# Patient Record
Sex: FEMALE | Race: WHITE | NOT HISPANIC OR LATINO | URBAN - METROPOLITAN AREA
[De-identification: names, ages, dates, MRNs, and addresses within clinical notes are randomized per-mention and may not be internally consistent; named-entity substitution may affect disease eponyms.]

---

## 2022-07-24 ENCOUNTER — INPATIENT (INPATIENT)
Facility: HOSPITAL | Age: 37
LOS: 0 days | Discharge: HOME | End: 2022-07-25
Attending: INTERNAL MEDICINE | Admitting: INTERNAL MEDICINE

## 2022-07-24 VITALS
HEART RATE: 118 BPM | WEIGHT: 125 LBS | RESPIRATION RATE: 20 BRPM | SYSTOLIC BLOOD PRESSURE: 131 MMHG | TEMPERATURE: 99 F | OXYGEN SATURATION: 98 % | DIASTOLIC BLOOD PRESSURE: 90 MMHG

## 2022-07-24 DIAGNOSIS — Z83.6 FAMILY HISTORY OF OTHER DISEASES OF THE RESPIRATORY SYSTEM: ICD-10-CM

## 2022-07-24 DIAGNOSIS — I26.99 OTHER PULMONARY EMBOLISM WITHOUT ACUTE COR PULMONALE: ICD-10-CM

## 2022-07-24 DIAGNOSIS — N39.0 URINARY TRACT INFECTION, SITE NOT SPECIFIED: ICD-10-CM

## 2022-07-24 DIAGNOSIS — J18.9 PNEUMONIA, UNSPECIFIED ORGANISM: ICD-10-CM

## 2022-07-24 DIAGNOSIS — F41.9 ANXIETY DISORDER, UNSPECIFIED: ICD-10-CM

## 2022-07-24 DIAGNOSIS — R10.9 UNSPECIFIED ABDOMINAL PAIN: ICD-10-CM

## 2022-07-24 LAB
ALBUMIN SERPL ELPH-MCNC: 4.7 G/DL — SIGNIFICANT CHANGE UP (ref 3.5–5.2)
ALP SERPL-CCNC: 69 U/L — SIGNIFICANT CHANGE UP (ref 30–115)
ALT FLD-CCNC: 7 U/L — SIGNIFICANT CHANGE UP (ref 0–41)
ANION GAP SERPL CALC-SCNC: 12 MMOL/L — SIGNIFICANT CHANGE UP (ref 7–14)
APPEARANCE UR: CLEAR — SIGNIFICANT CHANGE UP
AST SERPL-CCNC: 12 U/L — SIGNIFICANT CHANGE UP (ref 0–41)
BACTERIA # UR AUTO: ABNORMAL
BASOPHILS # BLD AUTO: 0.02 K/UL — SIGNIFICANT CHANGE UP (ref 0–0.2)
BASOPHILS NFR BLD AUTO: 0.2 % — SIGNIFICANT CHANGE UP (ref 0–1)
BILIRUB SERPL-MCNC: 0.6 MG/DL — SIGNIFICANT CHANGE UP (ref 0.2–1.2)
BILIRUB UR-MCNC: NEGATIVE — SIGNIFICANT CHANGE UP
BUN SERPL-MCNC: 7 MG/DL — LOW (ref 10–20)
CALCIUM SERPL-MCNC: 9.6 MG/DL — SIGNIFICANT CHANGE UP (ref 8.5–10.1)
CHLORIDE SERPL-SCNC: 101 MMOL/L — SIGNIFICANT CHANGE UP (ref 98–110)
CO2 SERPL-SCNC: 27 MMOL/L — SIGNIFICANT CHANGE UP (ref 17–32)
COLOR SPEC: YELLOW — SIGNIFICANT CHANGE UP
CREAT SERPL-MCNC: 0.7 MG/DL — SIGNIFICANT CHANGE UP (ref 0.7–1.5)
DIFF PNL FLD: ABNORMAL
EGFR: 114 ML/MIN/1.73M2 — SIGNIFICANT CHANGE UP
EOSINOPHIL # BLD AUTO: 0.01 K/UL — SIGNIFICANT CHANGE UP (ref 0–0.7)
EOSINOPHIL NFR BLD AUTO: 0.1 % — SIGNIFICANT CHANGE UP (ref 0–8)
EPI CELLS # UR: ABNORMAL /HPF
GLUCOSE SERPL-MCNC: 101 MG/DL — HIGH (ref 70–99)
GLUCOSE UR QL: NEGATIVE MG/DL — SIGNIFICANT CHANGE UP
HCT VFR BLD CALC: 40.3 % — SIGNIFICANT CHANGE UP (ref 37–47)
HGB BLD-MCNC: 13.5 G/DL — SIGNIFICANT CHANGE UP (ref 12–16)
IMM GRANULOCYTES NFR BLD AUTO: 0.2 % — SIGNIFICANT CHANGE UP (ref 0.1–0.3)
KETONES UR-MCNC: 15
LEUKOCYTE ESTERASE UR-ACNC: ABNORMAL
LIDOCAIN IGE QN: 16 U/L — SIGNIFICANT CHANGE UP (ref 7–60)
LYMPHOCYTES # BLD AUTO: 1.23 K/UL — SIGNIFICANT CHANGE UP (ref 1.2–3.4)
LYMPHOCYTES # BLD AUTO: 14 % — LOW (ref 20.5–51.1)
MCHC RBC-ENTMCNC: 27.2 PG — SIGNIFICANT CHANGE UP (ref 27–31)
MCHC RBC-ENTMCNC: 33.5 G/DL — SIGNIFICANT CHANGE UP (ref 32–37)
MCV RBC AUTO: 81.3 FL — SIGNIFICANT CHANGE UP (ref 81–99)
MONOCYTES # BLD AUTO: 0.73 K/UL — HIGH (ref 0.1–0.6)
MONOCYTES NFR BLD AUTO: 8.3 % — SIGNIFICANT CHANGE UP (ref 1.7–9.3)
NEUTROPHILS # BLD AUTO: 6.77 K/UL — HIGH (ref 1.4–6.5)
NEUTROPHILS NFR BLD AUTO: 77.2 % — HIGH (ref 42.2–75.2)
NITRITE UR-MCNC: NEGATIVE — SIGNIFICANT CHANGE UP
NRBC # BLD: 0 /100 WBCS — SIGNIFICANT CHANGE UP (ref 0–0)
NT-PROBNP SERPL-SCNC: 59 PG/ML — SIGNIFICANT CHANGE UP (ref 0–300)
PH UR: 6 — SIGNIFICANT CHANGE UP (ref 5–8)
PLATELET # BLD AUTO: 195 K/UL — SIGNIFICANT CHANGE UP (ref 130–400)
POTASSIUM SERPL-MCNC: 4.1 MMOL/L — SIGNIFICANT CHANGE UP (ref 3.5–5)
POTASSIUM SERPL-SCNC: 4.1 MMOL/L — SIGNIFICANT CHANGE UP (ref 3.5–5)
PROT SERPL-MCNC: 8 G/DL — SIGNIFICANT CHANGE UP (ref 6–8)
PROT UR-MCNC: NEGATIVE MG/DL — SIGNIFICANT CHANGE UP
RBC # BLD: 4.96 M/UL — SIGNIFICANT CHANGE UP (ref 4.2–5.4)
RBC # FLD: 12.4 % — SIGNIFICANT CHANGE UP (ref 11.5–14.5)
RBC CASTS # UR COMP ASSIST: SIGNIFICANT CHANGE UP /HPF
SARS-COV-2 RNA SPEC QL NAA+PROBE: SIGNIFICANT CHANGE UP
SODIUM SERPL-SCNC: 140 MMOL/L — SIGNIFICANT CHANGE UP (ref 135–146)
SP GR SPEC: 1.01 — SIGNIFICANT CHANGE UP (ref 1.01–1.03)
TROPONIN T SERPL-MCNC: <0.01 NG/ML — SIGNIFICANT CHANGE UP
UROBILINOGEN FLD QL: 0.2 MG/DL — SIGNIFICANT CHANGE UP
WBC # BLD: 8.78 K/UL — SIGNIFICANT CHANGE UP (ref 4.8–10.8)
WBC # FLD AUTO: 8.78 K/UL — SIGNIFICANT CHANGE UP (ref 4.8–10.8)
WBC UR QL: ABNORMAL /HPF

## 2022-07-24 PROCEDURE — 71275 CT ANGIOGRAPHY CHEST: CPT | Mod: 26,MA

## 2022-07-24 PROCEDURE — 99222 1ST HOSP IP/OBS MODERATE 55: CPT

## 2022-07-24 PROCEDURE — 99285 EMERGENCY DEPT VISIT HI MDM: CPT

## 2022-07-24 PROCEDURE — 74177 CT ABD & PELVIS W/CONTRAST: CPT | Mod: 26,MA

## 2022-07-24 PROCEDURE — 93010 ELECTROCARDIOGRAM REPORT: CPT

## 2022-07-24 PROCEDURE — 76856 US EXAM PELVIC COMPLETE: CPT | Mod: 26

## 2022-07-24 RX ORDER — PANTOPRAZOLE SODIUM 20 MG/1
40 TABLET, DELAYED RELEASE ORAL ONCE
Refills: 0 | Status: DISCONTINUED | OUTPATIENT
Start: 2022-07-24 | End: 2022-07-25

## 2022-07-24 RX ORDER — LANOLIN ALCOHOL/MO/W.PET/CERES
3 CREAM (GRAM) TOPICAL AT BEDTIME
Refills: 0 | Status: DISCONTINUED | OUTPATIENT
Start: 2022-07-24 | End: 2022-07-25

## 2022-07-24 RX ORDER — CHLORHEXIDINE GLUCONATE 213 G/1000ML
1 SOLUTION TOPICAL
Refills: 0 | Status: DISCONTINUED | OUTPATIENT
Start: 2022-07-24 | End: 2022-07-25

## 2022-07-24 RX ORDER — SODIUM CHLORIDE 9 MG/ML
1000 INJECTION INTRAMUSCULAR; INTRAVENOUS; SUBCUTANEOUS
Refills: 0 | Status: DISCONTINUED | OUTPATIENT
Start: 2022-07-24 | End: 2022-07-25

## 2022-07-24 RX ORDER — SODIUM CHLORIDE 9 MG/ML
1000 INJECTION INTRAMUSCULAR; INTRAVENOUS; SUBCUTANEOUS ONCE
Refills: 0 | Status: COMPLETED | OUTPATIENT
Start: 2022-07-24 | End: 2022-07-24

## 2022-07-24 RX ORDER — ACETAMINOPHEN 500 MG
650 TABLET ORAL EVERY 6 HOURS
Refills: 0 | Status: DISCONTINUED | OUTPATIENT
Start: 2022-07-24 | End: 2022-07-25

## 2022-07-24 RX ORDER — CEFTRIAXONE 500 MG/1
1000 INJECTION, POWDER, FOR SOLUTION INTRAMUSCULAR; INTRAVENOUS ONCE
Refills: 0 | Status: COMPLETED | OUTPATIENT
Start: 2022-07-24 | End: 2022-07-24

## 2022-07-24 RX ORDER — ALPRAZOLAM 0.25 MG
0.5 TABLET ORAL ONCE
Refills: 0 | Status: DISCONTINUED | OUTPATIENT
Start: 2022-07-24 | End: 2022-07-24

## 2022-07-24 RX ORDER — ENOXAPARIN SODIUM 100 MG/ML
60 INJECTION SUBCUTANEOUS ONCE
Refills: 0 | Status: COMPLETED | OUTPATIENT
Start: 2022-07-24 | End: 2022-07-24

## 2022-07-24 RX ORDER — KETOROLAC TROMETHAMINE 30 MG/ML
15 SYRINGE (ML) INJECTION ONCE
Refills: 0 | Status: DISCONTINUED | OUTPATIENT
Start: 2022-07-24 | End: 2022-07-25

## 2022-07-24 RX ORDER — KETOROLAC TROMETHAMINE 30 MG/ML
30 SYRINGE (ML) INJECTION ONCE
Refills: 0 | Status: DISCONTINUED | OUTPATIENT
Start: 2022-07-24 | End: 2022-07-24

## 2022-07-24 RX ORDER — ENOXAPARIN SODIUM 100 MG/ML
60 INJECTION SUBCUTANEOUS EVERY 12 HOURS
Refills: 0 | Status: DISCONTINUED | OUTPATIENT
Start: 2022-07-25 | End: 2022-07-25

## 2022-07-24 RX ORDER — ONDANSETRON 8 MG/1
4 TABLET, FILM COATED ORAL EVERY 8 HOURS
Refills: 0 | Status: DISCONTINUED | OUTPATIENT
Start: 2022-07-24 | End: 2022-07-25

## 2022-07-24 RX ORDER — CEFTRIAXONE 500 MG/1
1000 INJECTION, POWDER, FOR SOLUTION INTRAMUSCULAR; INTRAVENOUS EVERY 24 HOURS
Refills: 0 | Status: DISCONTINUED | OUTPATIENT
Start: 2022-07-25 | End: 2022-07-25

## 2022-07-24 RX ADMIN — SODIUM CHLORIDE 125 MILLILITER(S): 9 INJECTION INTRAMUSCULAR; INTRAVENOUS; SUBCUTANEOUS at 12:02

## 2022-07-24 RX ADMIN — CEFTRIAXONE 100 MILLIGRAM(S): 500 INJECTION, POWDER, FOR SOLUTION INTRAMUSCULAR; INTRAVENOUS at 21:36

## 2022-07-24 RX ADMIN — Medication 0.5 MILLIGRAM(S): at 21:36

## 2022-07-24 RX ADMIN — Medication 30 MILLIGRAM(S): at 15:39

## 2022-07-24 RX ADMIN — SODIUM CHLORIDE 1000 MILLILITER(S): 9 INJECTION INTRAMUSCULAR; INTRAVENOUS; SUBCUTANEOUS at 15:39

## 2022-07-24 RX ADMIN — SODIUM CHLORIDE 125 MILLILITER(S): 9 INJECTION INTRAMUSCULAR; INTRAVENOUS; SUBCUTANEOUS at 21:36

## 2022-07-24 RX ADMIN — Medication 30 MILLIGRAM(S): at 13:19

## 2022-07-24 RX ADMIN — ENOXAPARIN SODIUM 60 MILLIGRAM(S): 100 INJECTION SUBCUTANEOUS at 20:19

## 2022-07-24 NOTE — ED PROVIDER NOTE - OBJECTIVE STATEMENT
36-year-old female complaining of right lower quadrant pain for 5 days.  Pain has been intermittent, moderate in severity with no modifying factors.  Patient had some diarrhea while she was in the [place] earlier this week.  2 days ago she developed right flank pain.  She went to urgent care yesterday and was started on Keflex.  No fever, chills, nausea, vomiting, constipation.  No hematuria or dysuria. LMP last week.

## 2022-07-24 NOTE — ED PROVIDER NOTE - CLINICAL SUMMARY MEDICAL DECISION MAKING FREE TEXT BOX
Labs unremarkable.  COVID swab negative.  CT abdomen no acute pathology.  CTA chest right lower lobe PE with pulmonary infarct, no right heart strain.  Given Lovenox.  Will admit.

## 2022-07-24 NOTE — H&P ADULT - ASSESSMENT
36-year-old female with no significant past medical history  complaining of right lower quadrant pain for 5 days.  Pain has been intermittent, moderate in severity with no modifying factors.  [place]   Pt added that 2 days ago she developed right flank pain.  She went to urgent care yesterday and was started on Keflex.  No fever, chills, nausea, vomiting, constipation.  No hematuria or dysuria. LMP recently.    UTI  ABX  rocephin  ivf    PE   Pt not on birth control pills.  recent flight to ... PE?    continue Lovenox started in ER    anxiey:  xanax    prophylaxis GI/VTE  Case D/W Attending

## 2022-07-24 NOTE — ED ADULT NURSE NOTE - OBJECTIVE STATEMENT
as per pt, "efren had abdominal pain since wednesday. yesterday I went to urgent care and they said I might have a uti. they gave me antibiotics but im still in a lot of pain"   pt denies fevers, nausea, vomiting

## 2022-07-24 NOTE — H&P ADULT - HISTORY OF PRESENT ILLNESS
36-year-old female with no significant past medical history  complaining of right lower quadrant pain for 5 days.  Pain has been intermittent, moderate in severity with no modifying factors.  Patient [place]   Pt added that 2 days ago she developed right flank pain.  She went to urgent care yesterday and was started on Keflex.  No fever, chills, nausea, vomiting, constipation.  No hematuria or dysuria. LMP recently.      pt takes n medication beside keflex for uti

## 2022-07-24 NOTE — H&P ADULT - NSHPPHYSICALEXAM_GEN_ALL_CORE
GENERAL:  35y/o Female NAD, resting comfortably.  HEAD:  Atraumatic, Normocephalic  EYES: EOMI, PERRLA, conjunctiva and sclera clear  NECK: Supple, No JVD, no cervical lymphadenopathy, non-tender  CHEST/LUNG: Clear to auscultation bilaterally; No wheeze, rhonchi, or rales  HEART: Regular rate and rhythm; S1&S2  ABDOMEN: Soft, mildly tender, Nondistended x 4 quadrants; Bowel sounds present  EXTREMITIES:   Peripheral Pulses Present, No clubbing, no cyanosis, or no edema, no calf tenderness  PSYCH: AAOx3, cooperative, appropriate  NEUROLOGY: WNL  SKIN: WNL

## 2022-07-24 NOTE — ED PROVIDER NOTE - PHYSICAL EXAMINATION
Gen: Alert, NAD, well appearing  Head: NC, AT, PERRL, EOMI, normal lids/conjunctiva  ENT: normal hearing  Neck: +supple, no tenderness/meningismus,  Pulm: Bilateral BS, normal resp effort, no wheeze/stridor/retractions  CV: RRR  Abd: soft, NT/ND. No CVA tenderness  Mskel: no edema/erythema/cyanosis  Skin: no rash, warm/dry  Neuro: AAOx3, no sensory/motor deficits

## 2022-07-24 NOTE — ED PROVIDER NOTE - NS ED ATTENDING STATEMENT MOD
This was a shared visit with the HEATHER. I reviewed and verified the documentation and independently performed the documented:

## 2022-07-24 NOTE — H&P ADULT - NS ATTEND AMEND GEN_ALL_CORE FT
Seen at bedside independent form medical PA               Provoked pulmonary embolism? Seen  independent form medical PA, currently resting but significant other present at bedside Agree with assessment and plan though etiology of pulmonary embolism not clear (Provoked pulmonary embolism from air travel (wasn't very long) though there is family history). Agree with above assessment and plan Seen  independent form medical PA, currently resting but significant other present at bedside Agree with assessment (pulmonary emboli with infarction) and plan though etiology of pulmonary embolism not clear (Provoked pulmonary embolism from air travel (wasn't very long) though there is family history). Agree with above assessment and plan

## 2022-07-24 NOTE — ED PROVIDER NOTE - ATTENDING APP SHARED VISIT CONTRIBUTION OF CARE
patient is a 36-year-old female who presents for evaluation of right lower quadrant pain located in the right flank described as moderate and throbbing in nature not worse with movement no fevers no chills no vomiting patient returned from [place] vacation earlier this week and noted pain she visited an urgent care yesterday presumed to have a urinary tract infection started on Keflex the pain continued she denies any hematuria dysuria last menstrual period 1 week prior  on physical exam the patient is nc/at perrla eomi oropharynx clear cta b/l, rrr s1s2 noted patient has no guarding no rebound ext from with no focal deficits she has no worsening ttp of the right flank   a/p- give patient complaint and location of pain we obtained ct abd pelvis which reveals no kidney stone or uti, he has no fevers or chills.  us negative radiology noted possible pe we re-sent patient for dedicated ct chest which reveals pe as well as potential pulmonary infarct   given ac and admitted for further workup, patient is stable with no heart strain not meeting perc criteria at this time

## 2022-07-24 NOTE — H&P ADULT - NSHPLABSRESULTS_GEN_ALL_CORE
ct angio:  IMPRESSION:    Right lower lobar pulmonary embolus with distal segmental extension. No   CT evidence of right heart strain.    Right lower lobe pulmonary infarct. Trace adjacent pleural effusion.    Prominent precarinal lymph node measuring up to 0.9 cm in the short axis,   possibly at the upper range of normal or reactive.    CT abdo/pelvis; No hydronephrosis                          13.5   8.78  )-----------( 195      ( 2022 11:55 )             40.3       07-    140  |  101  |  7<L>  ----------------------------<  101<H>  4.1   |  27  |  0.7    Ca    9.6      2022 11:55    TPro  8.0  /  Alb  4.7  /  TBili  0.6  /  DBili  x   /  AST  12  /  ALT  7   /  AlkPhos  69  -              Urinalysis Basic - ( 2022 11:55 )    Color: Yellow / Appearance: Clear / S.010 / pH: x  Gluc: x / Ketone: 15  / Bili: Negative / Urobili: 0.2 mg/dL   Blood: x / Protein: Negative mg/dL / Nitrite: Negative   Leuk Esterase: Large / RBC: 1-2 /HPF / WBC 10-25 /HPF   Sq Epi: x / Non Sq Epi: Occasional /HPF / Bacteria: Few            Lactate Trend            CAPILLARY BLOOD GLUCOSE

## 2022-07-25 ENCOUNTER — TRANSCRIPTION ENCOUNTER (OUTPATIENT)
Age: 37
End: 2022-07-25

## 2022-07-25 VITALS
TEMPERATURE: 98 F | HEART RATE: 92 BPM | RESPIRATION RATE: 18 BRPM | SYSTOLIC BLOOD PRESSURE: 116 MMHG | DIASTOLIC BLOOD PRESSURE: 74 MMHG

## 2022-07-25 LAB
ANION GAP SERPL CALC-SCNC: 11 MMOL/L — SIGNIFICANT CHANGE UP (ref 7–14)
BUN SERPL-MCNC: 6 MG/DL — LOW (ref 10–20)
CALCIUM SERPL-MCNC: 8.8 MG/DL — SIGNIFICANT CHANGE UP (ref 8.5–10.1)
CHLORIDE SERPL-SCNC: 103 MMOL/L — SIGNIFICANT CHANGE UP (ref 98–110)
CO2 SERPL-SCNC: 25 MMOL/L — SIGNIFICANT CHANGE UP (ref 17–32)
CREAT SERPL-MCNC: 0.6 MG/DL — LOW (ref 0.7–1.5)
CULTURE RESULTS: SIGNIFICANT CHANGE UP
EGFR: 118 ML/MIN/1.73M2 — SIGNIFICANT CHANGE UP
GLUCOSE SERPL-MCNC: 94 MG/DL — SIGNIFICANT CHANGE UP (ref 70–99)
HCT VFR BLD CALC: 35.6 % — LOW (ref 37–47)
HGB BLD-MCNC: 11.9 G/DL — LOW (ref 12–16)
MCHC RBC-ENTMCNC: 27.2 PG — SIGNIFICANT CHANGE UP (ref 27–31)
MCHC RBC-ENTMCNC: 33.4 G/DL — SIGNIFICANT CHANGE UP (ref 32–37)
MCV RBC AUTO: 81.5 FL — SIGNIFICANT CHANGE UP (ref 81–99)
NRBC # BLD: 0 /100 WBCS — SIGNIFICANT CHANGE UP (ref 0–0)
PLATELET # BLD AUTO: 175 K/UL — SIGNIFICANT CHANGE UP (ref 130–400)
POTASSIUM SERPL-MCNC: 3.7 MMOL/L — SIGNIFICANT CHANGE UP (ref 3.5–5)
POTASSIUM SERPL-SCNC: 3.7 MMOL/L — SIGNIFICANT CHANGE UP (ref 3.5–5)
RBC # BLD: 4.37 M/UL — SIGNIFICANT CHANGE UP (ref 4.2–5.4)
RBC # FLD: 12.2 % — SIGNIFICANT CHANGE UP (ref 11.5–14.5)
SODIUM SERPL-SCNC: 139 MMOL/L — SIGNIFICANT CHANGE UP (ref 135–146)
SPECIMEN SOURCE: SIGNIFICANT CHANGE UP
WBC # BLD: 8.72 K/UL — SIGNIFICANT CHANGE UP (ref 4.8–10.8)
WBC # FLD AUTO: 8.72 K/UL — SIGNIFICANT CHANGE UP (ref 4.8–10.8)

## 2022-07-25 PROCEDURE — 99223 1ST HOSP IP/OBS HIGH 75: CPT

## 2022-07-25 PROCEDURE — 93970 EXTREMITY STUDY: CPT | Mod: 26

## 2022-07-25 PROCEDURE — 71045 X-RAY EXAM CHEST 1 VIEW: CPT | Mod: 26

## 2022-07-25 PROCEDURE — 99239 HOSP IP/OBS DSCHRG MGMT >30: CPT

## 2022-07-25 RX ORDER — ACETAMINOPHEN WITH CODEINE 300MG-30MG
1 TABLET ORAL
Qty: 12 | Refills: 0
Start: 2022-07-25 | End: 2022-07-27

## 2022-07-25 RX ORDER — APIXABAN 2.5 MG/1
1 TABLET, FILM COATED ORAL
Qty: 148 | Refills: 0
Start: 2022-07-25 | End: 2022-10-06

## 2022-07-25 RX ORDER — KETOROLAC TROMETHAMINE 30 MG/ML
30 SYRINGE (ML) INJECTION ONCE
Refills: 0 | Status: DISCONTINUED | OUTPATIENT
Start: 2022-07-25 | End: 2022-07-25

## 2022-07-25 RX ADMIN — Medication 30 MILLIGRAM(S): at 11:46

## 2022-07-25 RX ADMIN — Medication 15 MILLIGRAM(S): at 03:57

## 2022-07-25 RX ADMIN — ENOXAPARIN SODIUM 60 MILLIGRAM(S): 100 INJECTION SUBCUTANEOUS at 05:43

## 2022-07-25 NOTE — CHART NOTE - NSCHARTNOTEFT_GEN_A_CORE
Called by medicine attending, patient stable for discharge.  Eliquis sent to pharmacy, CM verified it is covered by INS.  Will send #12 Tylenol #3 for pain.  Will also send Augmentin 875 bid x 7 days for suspected UTI and PNA.  Papers completed.  Orders placed.

## 2022-07-25 NOTE — DISCHARGE NOTE PROVIDER - NSDCCPCAREPLAN_GEN_ALL_CORE_FT
PRINCIPAL DISCHARGE DIAGNOSIS  Diagnosis: Pulmonary embolism  Assessment and Plan of Treatment: You were diagnosed with a blood clot in your lung.  You will be given an oral anticoagulant on discharge.  Make sure to take this daily as instrcuted.  FU with Pulm in 2-4 weeks and Heme in 4 weeks.  Make sure to FU with your PMD.  You were also given ABX for UTI and possible PNA.  Make sure to complete the course

## 2022-07-25 NOTE — CONSULT NOTE ADULT - SUBJECTIVE AND OBJECTIVE BOX
Patient is a 36y old  Female who presents with a chief complaint of uti/ + PE on scan (2022 20:35)      HPI:  36-year-old female with no significant past medical history  complaining of right lower quadrant pain for 5 days.  Pain has been intermittent, moderate in severity with no modifying factors.  Patient [place]   Pt added that 2 days ago she developed right flank pain.  She went to urgent care yesterday and was started on Keflex.  No fever, chills, nausea, vomiting, constipation.  No hematuria or dysuria. LMP recently.  ct abd ?? PE , s/p CT Pe protocol and has Right PE   patient has recent travel from  last tuesday   pt takes n medication beside keflex for uti   (2022 20:35)      PAST MEDICAL & SURGICAL HISTORY:  No pertinent past medical history      No significant past surgical history          FAMILY HISTORY:  No pertinent family history in first degree relatives      Family history: No family cardiovascular system   Occupation:  Alochol: Denied  Smoking: Denied  Drug Use: Denied  Marital Status:           Allergies    No Known Allergies    Intolerances        Home Medications:      ROS: as in HPI; All other systems reviewed are negative        PHYSICAL EXAM:  Vital Signs Last 24 Hrs  T(C): 36.8 (2022 06:48), Max: 37.4 (2022 10:22)  T(F): 98.2 (2022 06:48), Max: 99.4 (2022 10:22)  HR: 92 (2022 06:48) (84 - 118)  BP: 116/74 (2022 06:48) (110/64 - 131/90)  BP(mean): --  RR: 18 (2022 06:48) (16 - 20)  SpO2: 99% (2022 05:00) (98% - 99%)    Parameters below as of 2022 05:00  Patient On (Oxygen Delivery Method): room air          GENERAL: NAD, well-groomed, well-developed  HEAD:  Atraumatic, Normocephalic  EYES: EOMI, PERRLA, conjunctiva and sclera clear  ENMT: No tonsillar erythema, exudates, or enlargement; Moist mucous membranes, Good dentition, No lesions  NECK: Supple, No JVD, Normal thyroid  NERVOUS SYSTEM:  Alert & Oriented X3, Good concentration; Motor Strength 5/5 B/L upper and lower extremities; DTRs 2+ intact and symmetric  CHEST/LUNG: Clear to percussion bilaterally; No rales, rhonchi, wheezing, or rubs  HEART: Regular rate and rhythm; No murmurs, rubs, or gallops  ABDOMEN: Soft, Nontender, Nondistended; Bowel sounds present  EXTREMITIES:  2+ Peripheral Pulses, No clubbing, cyanosis, or edema  LYMPH: No lymphadenopathy noted  SKIN: No rashes or lesions    HOSPITAL MEDICATIONS:  MEDICATIONS  (STANDING):  cefTRIAXone   IVPB 1000 milliGRAM(s) IV Intermittent every 24 hours  chlorhexidine 4% Liquid 1 Application(s) Topical <User Schedule>  enoxaparin Injectable 60 milliGRAM(s) SubCutaneous every 12 hours  pantoprazole  Injectable 40 milliGRAM(s) IV Push once  sodium chloride 0.9%. 1000 milliLiter(s) (125 mL/Hr) IV Continuous <Continuous>    MEDICATIONS  (PRN):  acetaminophen     Tablet .. 650 milliGRAM(s) Oral every 6 hours PRN Temp greater or equal to 38C (100.4F), Mild Pain (1 - 3)  aluminum hydroxide/magnesium hydroxide/simethicone Suspension 30 milliLiter(s) Oral every 4 hours PRN Dyspepsia  melatonin 3 milliGRAM(s) Oral at bedtime PRN Insomnia  ondansetron Injectable 4 milliGRAM(s) IV Push every 8 hours PRN Nausea and/or Vomiting      LABS:                        11.9   8.72  )-----------( 175      ( 2022 06:03 )             35.6     07-    139  |  103  |  6<L>  ----------------------------<  94  3.7   |  25  |  0.6<L>    Ca    8.8      2022 06:03    TPro  8.0  /  Alb  4.7  /  TBili  0.6  /  DBili  x   /  AST  12  /  ALT  7   /  AlkPhos  69  07-24      Urinalysis Basic - ( 2022 11:55 )    Color: Yellow / Appearance: Clear / S.010 / pH: x  Gluc: x / Ketone: 15  / Bili: Negative / Urobili: 0.2 mg/dL   Blood: x / Protein: Negative mg/dL / Nitrite: Negative   Leuk Esterase: Large / RBC: 1-2 /HPF / WBC 10-25 /HPF   Sq Epi: x / Non Sq Epi: Occasional /HPF / Bacteria: Few                RADIOLOGY: < from: CT Angio Chest PE Protocol w/ IV Cont (22 @ 18:45) >  Right lower lobar pulmonary embolus with distal segmental extension. No   CT evidence of right heart strain.    Right lower lobe pulmonary infarct. Trace adjacent pleural effusion.    Prominent precarinal lymph node measuring up to 0.9 cm in the short axis,   possibly at the upper range of normal or reactive.    < end of copied text >    [ ] Reviewed and interpreted by me    ECHO:    Point of Care Ultrasound Findings;    PFT:

## 2022-07-25 NOTE — DISCHARGE NOTE PROVIDER - CARE PROVIDER_API CALL
Primary Care, Doctor  Phone: (   )    -  Fax: (   )    -  Follow Up Time:     Alden Werner (MD)  Critical Care Medicine; Internal Medicine; Pulmonary Disease  [address], [facility] [address]  [place] [address]  Phone: [phone]  Fax: [phone]  Follow Up Time: 1 month    Maribell Ardon  INTERNAL MEDICINE  [address] [place] [address]  Phone: [phone]  Fax: [phone]  Follow Up Time: 1 month

## 2022-07-25 NOTE — DISCHARGE NOTE NURSING/CASE MANAGEMENT/SOCIAL WORK - NSDCPEFALRISK_GEN_ALL_CORE
For information on Fall & Injury Prevention, visit: [email or web address] OR  [email or web address] OR  https://www.cdc.gov/steadi/patient.html

## 2022-07-25 NOTE — DISCHARGE NOTE PROVIDER - NSDCMRMEDTOKEN_GEN_ALL_CORE_FT
acetaminophen-codeine 300 mg-15 mg oral tablet: 1 tab(s) orally every 4 hours, As Needed MDD:4/day  amoxicillin-clavulanate 875 mg-125 mg oral tablet: 1 tab(s) orally 2 times a day   Eliquis 5 mg oral tablet: 2tab BID x 7 days then 1 tab PO BID thereafter   amoxicillin-clavulanate 875 mg-125 mg oral tablet: 1 tab(s) orally 2 times a day   Eliquis 5 mg oral tablet: 2tab BID x 7 days then 1 tab PO BID thereafter  oxycodone-acetaminophen 5 mg-325 mg oral tablet: 1 tab(s) orally every 8 hours, As Needed MDD:3/day

## 2022-07-25 NOTE — DISCHARGE NOTE NURSING/CASE MANAGEMENT/SOCIAL WORK - PATIENT PORTAL LINK FT
You can access the FollowMyHealth Patient Portal offered by [facility] by registering at the following website: [email or web address]. By joining [employer]’s FollowMyHealth portal, you will also be able to view your health information using other applications (apps) compatible with our system.

## 2022-07-25 NOTE — DISCHARGE NOTE PROVIDER - PROVIDER TOKENS
FREE:[LAST:[Primary Care],FIRST:[Doctor],PHONE:[(   )    -],FAX:[(   )    -]],PROVIDER:[TOKEN:[[id number]],FOLLOWUP:[1 month]],PROVIDER:[TOKEN:[[id number]],FOLLOWUP:[1 month]]

## 2022-07-25 NOTE — DISCHARGE NOTE PROVIDER - HOSPITAL COURSE
36-year-old female with no significant past medical history  complaining of right lower quadrant pain for 5 days.  Pain has been intermittent, moderate in severity with no modifying factors.  Patient had [place]   Pt added that 2 days ago she developed right flank pain.  She went to urgent care yesterday and was started on Keflex.  No fever, chills, nausea, vomiting, constipation.  No hematuria or dysuria. LMP recently.      Patient admitted to medicine service and started on Tx Lovenox and Rocephin for UTI.  Patient seen by Pulm, recommended BLE Doppler which was performed and ***.  Further recommendations included starting DOAC on discahrge and outpatient FU with both Pulm and Heme.  Pulm also with concern for PNA on CT scan though not reported in official read.  Patient remained stable, saturated well on RA and  discharged home on DOAC.  Patient also discharged on Augmentin x 7 days to cover for UTI and PNA.  Recommended outpatient Heme FU in 1 month and Pulm FU in 2-4 weeks.  Strict return precautions given. 36-year-old female with no significant past medical history  complaining of right lower quadrant pain for 5 days.  Pain has been intermittent, moderate in severity with no modifying factors.  Patient had some [place]   Pt added that 2 days ago she developed right flank pain.  She went to urgent care yesterday and was started on Keflex.  No fever, chills, nausea, vomiting, constipation.  No hematuria or dysuria. LMP recently.      Patient admitted to medicine service and started on Tx Lovenox and Rocephin for UTI.  Patient seen by Pulm, recommended BLE Doppler which was performed and ***.  Further recommendations included starting DOAC on discahrge and outpatient FU with both Pulm and Heme.  Pulm also with concern for PNA on CT scan though not reported in official read.  Patient remained stable, saturated well on RA and  discharged home on DOAC.  Patient also discharged on Augmentin x 7 days to cover for UTI and PNA.  Recommended outpatient Heme FU in 1 month and Pulm FU in 2-4 weeks.  Strict return precautions given.  Patient also given APAP #3 at her request 2/2 pain from PE.  #12 tabs given. 36-year-old female with no significant past medical history  complaining of right lower quadrant pain for 5 days.  Pain has been intermittent, moderate in severity with no modifying factors.  Patient had some [place]   Pt added that 2 days ago she developed right flank pain.  She went to urgent care yesterday and was started on Keflex.  No fever, chills, nausea, vomiting, constipation.  No hematuria or dysuria. LMP recently.      Patient admitted to medicine service and started on Tx Lovenox and Rocephin for UTI.  Patient seen by Pulm, recommended BLE Doppler which was performed and negative.  Further recommendations included starting DOAC on discahrge and outpatient FU with both Pulm and Heme.  Pulm also with concern for PNA on CT scan though not reported in official read.  Patient remained stable, saturated well on RA and  discharged home on DOAC.  Patient also discharged on Augmentin x 7 days to cover for UTI and PNA.  Recommended outpatient Heme FU in 1 month and Pulm FU in 2-4 weeks.  Strict return precautions given.  Patient also given APAP #3 at her request 2/2 pain from PE.  #12 tabs given. 36-year-old female with no significant past medical history  complaining of right lower quadrant pain for 5 days.  Pain has been intermittent, moderate in severity with no modifying factors.  Patient had some [place].   Pt added that 2 days ago she developed right flank pain.  She went to urgent care yesterday and was started on Keflex.  No fever, chills, nausea, vomiting, constipation.  No hematuria or dysuria. LMP recently.      Patient admitted to medicine service and started on Tx Lovenox and Rocephin for UTI.  Patient seen by Pulm, recommended BLE Doppler which was performed and negative.  Further recommendations included starting DOAC on discahrge and outpatient FU with both Pulm and Heme.  Pulm also with concern for PNA on CT scan though not reported in official read.  Patient remained stable, saturated well on RA and  discharged home on DOAC.  Patient also discharged on Augmentin x 7 days to cover for UTI and PNA.  Recommended outpatient Heme FU in 1 month and Pulm FU in 2-4 weeks.  Strict return precautions given.  Patient also given APAP #3 at her request 2/2 pain from PE.  #12 tabs given. Last minute, patient changed her mind and requesting Percocet.

## 2022-07-25 NOTE — CONSULT NOTE ADULT - ASSESSMENT
Impression:  PE with pulmonary infarct doubt PNA         Plan:  do doppler lower ext   can switch to eliquis or xarelto need 6 month to life long   need outpatient hematology eval patient was informed   need to repeat ct scan in 6 week to confirm resolution RLL   patient and  informed need to follow up as outpatient   check pox on Ra  rest and exertion   abx as per medical team for UTI   outpatient follow up

## 2022-07-25 NOTE — DISCHARGE NOTE PROVIDER - ATTENDING DISCHARGE PHYSICAL EXAMINATION:
Physical Exam: GENERAL:  37y/o Female NAD, resting comfortably.  HEAD:  Atraumatic, Normocephalic  EYES: EOMI, PERRLA, conjunctiva and sclera clear  NECK: Supple, No JVD, no cervical lymphadenopathy, non-tender  CHEST/LUNG: Clear to auscultation bilaterally; No wheeze, rhonchi, or rales  HEART: Regular rate and rhythm; S1&S2  ABDOMEN: Soft, mildly tender, Nondistended x 4 quadrants; Bowel sounds present  EXTREMITIES:   Peripheral Pulses Present, No clubbing, no cyanosis, or no edema, no calf tenderness  PSYCH: AAOx3, cooperative, appropriate  NEUROLOGY: WNL  SKIN: WNL

## 2022-07-25 NOTE — PATIENT PROFILE ADULT - FALL HARM RISK - UNIVERSAL INTERVENTIONS
Bed in lowest position, wheels locked, appropriate side rails in place/Call bell, personal items and telephone in reach/Instruct patient to call for assistance before getting out of bed or chair/Non-slip footwear when patient is out of bed/[place] to call system/Physically safe environment - no spills, clutter or unnecessary equipment/Purposeful Proactive Rounding/Room/bathroom lighting operational, light cord in reach

## 2022-08-05 PROBLEM — Z78.9 OTHER SPECIFIED HEALTH STATUS: Chronic | Status: ACTIVE | Noted: 2022-07-24

## 2022-09-09 ENCOUNTER — RX RENEWAL (OUTPATIENT)
Age: 37
End: 2022-09-09

## 2022-09-09 PROBLEM — Z00.00 ENCOUNTER FOR PREVENTIVE HEALTH EXAMINATION: Status: ACTIVE | Noted: 2022-09-09

## 2022-10-11 ENCOUNTER — APPOINTMENT (OUTPATIENT)
Age: 37
End: 2022-10-11

## 2022-10-11 VITALS
SYSTOLIC BLOOD PRESSURE: 138 MMHG | OXYGEN SATURATION: 98 % | HEART RATE: 104 BPM | HEIGHT: 62 IN | BODY MASS INDEX: 25.95 KG/M2 | DIASTOLIC BLOOD PRESSURE: 76 MMHG | WEIGHT: 141 LBS | RESPIRATION RATE: 16 BRPM

## 2022-10-11 DIAGNOSIS — Z78.9 OTHER SPECIFIED HEALTH STATUS: ICD-10-CM

## 2022-10-11 PROCEDURE — 99204 OFFICE O/P NEW MOD 45 MIN: CPT

## 2022-10-11 RX ORDER — APIXABAN 5 MG/1
5 TABLET, FILM COATED ORAL
Refills: 0 | Status: ACTIVE | COMMUNITY

## 2022-10-11 NOTE — PHYSICAL EXAM
[No Acute Distress] : no acute distress [Normal Oropharynx] : normal oropharynx [Normal Appearance] : normal appearance [No Neck Mass] : no neck mass [Normal Rate/Rhythm] : normal rate/rhythm [Normal S1, S2] : normal s1, s2 [No Murmurs] : no murmurs [No Resp Distress] : no resp distress [Clear to Auscultation Bilaterally] : clear to auscultation bilaterally [No Abnormalities] : no abnormalities [Benign] : benign [Normal Gait] : normal gait [No Clubbing] : no clubbing [No Cyanosis] : no cyanosis [No Edema] : no edema [FROM] : FROM [Normal Color/ Pigmentation] : normal color/ pigmentation [No Focal Deficits] : no focal deficits [Oriented x3] : oriented x3 [Normal Affect] : normal affect Education provided on the pain management plan of care

## 2022-10-11 NOTE — HISTORY OF PRESENT ILLNESS
[TextBox_4] : 37 YEARS OLD PRESENTED FOR ABOVE, WENT TO ED ON 7/24 FOR R SIDED PLEURITIC CP, CT ANGIO/ PE. INFARCT, WAS GIVEN ELIQUIS, FEW DAYS PRIOR WAS IN [place] REP, MOTHER WITH HX OF DVT, FEELS BETTER, STILL SOB ON EXERTION, NO ECHO, SAW HEMOC ALI

## 2022-10-11 NOTE — DISCUSSION/SUMMARY
[FreeTextEntry1] : UNPROVOKED PE/ MOTHER REMOTE HX OF DVT\par SOB ON EXERTION\par CT ANGIO 7/24\par LE DOPPLER -\par SAW HEMOC ALI\par REPEAT CT ANGIO IN 6 MONTH, BLOOD TEST PRIOR\par ECHO\par HOSP RECORDS / CHART REVIEWED\par PFT

## 2023-01-03 ENCOUNTER — RESULT REVIEW (OUTPATIENT)
Age: 38
End: 2023-01-03

## 2023-01-03 ENCOUNTER — OUTPATIENT (OUTPATIENT)
Dept: OUTPATIENT SERVICES | Facility: HOSPITAL | Age: 38
LOS: 1 days | Discharge: HOME | End: 2023-01-03
Payer: COMMERCIAL

## 2023-01-03 DIAGNOSIS — I26.99 OTHER PULMONARY EMBOLISM WITHOUT ACUTE COR PULMONALE: ICD-10-CM

## 2023-01-03 PROCEDURE — 71275 CT ANGIOGRAPHY CHEST: CPT | Mod: 26

## 2023-01-11 ENCOUNTER — APPOINTMENT (OUTPATIENT)
Age: 38
End: 2023-01-11
Payer: COMMERCIAL

## 2023-01-11 VITALS
HEIGHT: 62 IN | WEIGHT: 144 LBS | BODY MASS INDEX: 26.5 KG/M2 | RESPIRATION RATE: 14 BRPM | SYSTOLIC BLOOD PRESSURE: 112 MMHG | DIASTOLIC BLOOD PRESSURE: 78 MMHG | OXYGEN SATURATION: 98 % | HEART RATE: 104 BPM

## 2023-01-11 PROCEDURE — 99213 OFFICE O/P EST LOW 20 MIN: CPT

## 2023-01-11 NOTE — DISCUSSION/SUMMARY
[FreeTextEntry1] : UNPROVOKED PE/ MOTHER REMOTE HX OF DVT\par SOB ON EXERTION\par CT ANGIO REVIEWED\par LE DOPPLER -\par SAW HEMOC ALI\par REPEAT CT ANGIO IN 6 MONTH REVIEWED\par PFT

## 2023-07-03 ENCOUNTER — APPOINTMENT (OUTPATIENT)
Dept: PULMONOLOGY | Facility: CLINIC | Age: 38
End: 2023-07-03
Payer: COMMERCIAL

## 2023-07-03 VITALS
WEIGHT: 152 LBS | HEIGHT: 62 IN | RESPIRATION RATE: 12 BRPM | SYSTOLIC BLOOD PRESSURE: 120 MMHG | HEART RATE: 95 BPM | DIASTOLIC BLOOD PRESSURE: 60 MMHG | BODY MASS INDEX: 27.97 KG/M2 | OXYGEN SATURATION: 97 %

## 2023-07-03 DIAGNOSIS — I26.99 OTHER PULMONARY EMBOLISM W/OUT ACUTE COR PULMONALE: ICD-10-CM

## 2023-07-03 DIAGNOSIS — R06.02 SHORTNESS OF BREATH: ICD-10-CM

## 2023-07-03 PROCEDURE — 99213 OFFICE O/P EST LOW 20 MIN: CPT

## 2023-07-03 NOTE — DISCUSSION/SUMMARY
[FreeTextEntry1] : UNPROVOKED PE/ MOTHER REMOTE HX OF DVT\par SOB ON EXERTION\par CT ANGIO -\par LE DOPPLER -\par SAW HEMOC ALI\par

## 2024-07-05 ENCOUNTER — APPOINTMENT (OUTPATIENT)
Dept: PULMONOLOGY | Facility: CLINIC | Age: 39
End: 2024-07-05

## 2024-08-23 NOTE — ED ADULT NURSE NOTE - NS ED NURSE DISCH DISPOSITION
[General Appearance - Alert] : alert [General Appearance - In No Acute Distress] : in no acute distress [General Appearance - Well Nourished] : well nourished [General Appearance - Well Developed] : well developed [General Appearance - Well-Appearing] : healthy appearing [Examination Of The Oral Cavity] : the lips and gums were normal [Hearing Threshold Finger Rub Not [place]] : hearing was normal [Neck Appearance] : the appearance of the neck was normal [Neck Cervical Mass (___cm)] : no neck mass was observed [Jugular Venous Distention Increased] : there was no jugular-venous distention [Respiration, Rhythm And Depth] : normal respiratory rhythm and effort [Exaggerated Use Of Accessory Muscles For Inspiration] : no accessory muscle use [Auscultation Breath Sounds / Voice Sounds] : lungs were clear to auscultation bilaterally [Heart Rate And Rhythm] : heart rate was normal and rhythm regular [Heart Sounds] : normal S1 and S2 [Heart Sounds Gallop] : no gallops [Edema] : there was no peripheral edema [Bowel Sounds] : normal bowel sounds [Abdomen Soft] : soft [Abdomen Tenderness] : non-tender [No CVA Tenderness] : no ~M costovertebral angle tenderness [No Spinal Tenderness] : no spinal tenderness [Abnormal Walk] : normal gait [Nail Clubbing] : no clubbing  or cyanosis of the fingernails [Musculoskeletal - Swelling] : no joint swelling seen [Skin Color & Pigmentation] : normal skin color and pigmentation [] : no rash [Skin Lesions] : no skin lesions [Oriented To Time, Place, And Person] : oriented to person, place, and time [Impaired Insight] : insight and judgment were intact [Affect] : the affect was normal [Mood] : the mood was normal Admitted